# Patient Record
Sex: MALE | Race: WHITE | NOT HISPANIC OR LATINO | URBAN - METROPOLITAN AREA
[De-identification: names, ages, dates, MRNs, and addresses within clinical notes are randomized per-mention and may not be internally consistent; named-entity substitution may affect disease eponyms.]

---

## 2017-02-01 ENCOUNTER — OUTPATIENT (OUTPATIENT)
Dept: OUTPATIENT SERVICES | Facility: HOSPITAL | Age: 57
LOS: 1 days | End: 2017-02-01
Payer: MEDICAID

## 2017-03-16 DIAGNOSIS — R69 ILLNESS, UNSPECIFIED: ICD-10-CM

## 2018-03-01 PROCEDURE — G9001: CPT

## 2018-09-21 ENCOUNTER — APPOINTMENT (OUTPATIENT)
Dept: FAMILY MEDICINE | Facility: CLINIC | Age: 58
End: 2018-09-21
Payer: MEDICAID

## 2018-09-21 VITALS
RESPIRATION RATE: 16 BRPM | DIASTOLIC BLOOD PRESSURE: 70 MMHG | WEIGHT: 181 LBS | TEMPERATURE: 98.5 F | HEART RATE: 69 BPM | HEIGHT: 70 IN | BODY MASS INDEX: 25.91 KG/M2 | OXYGEN SATURATION: 97 % | SYSTOLIC BLOOD PRESSURE: 140 MMHG

## 2018-09-21 DIAGNOSIS — Z13.228 ENCOUNTER FOR SCREENING FOR OTHER SUSPECTED ENDOCRINE DISORDER: ICD-10-CM

## 2018-09-21 DIAGNOSIS — Z87.898 PERSONAL HISTORY OF OTHER SPECIFIED CONDITIONS: ICD-10-CM

## 2018-09-21 DIAGNOSIS — Z13.29 ENCOUNTER FOR SCREENING FOR OTHER SUSPECTED ENDOCRINE DISORDER: ICD-10-CM

## 2018-09-21 DIAGNOSIS — Z13.220 ENCOUNTER FOR SCREENING FOR LIPOID DISORDERS: ICD-10-CM

## 2018-09-21 DIAGNOSIS — Z86.79 PERSONAL HISTORY OF OTHER DISEASES OF THE CIRCULATORY SYSTEM: ICD-10-CM

## 2018-09-21 DIAGNOSIS — Z13.21 ENCOUNTER FOR SCREENING FOR NUTRITIONAL DISORDER: ICD-10-CM

## 2018-09-21 DIAGNOSIS — Z13.0 ENCOUNTER FOR SCREENING FOR OTHER SUSPECTED ENDOCRINE DISORDER: ICD-10-CM
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
## 2018-09-21 PROCEDURE — G0008: CPT

## 2018-09-21 PROCEDURE — 90686 IIV4 VACC NO PRSV 0.5 ML IM: CPT

## 2018-09-21 PROCEDURE — 99215 OFFICE O/P EST HI 40 MIN: CPT | Mod: 25

## 2018-09-21 NOTE — PHYSICAL EXAM
[No Acute Distress] : no acute distress [Well Developed] : well developed [No Lymphadenopathy] : no lymphadenopathy [Thyroid Normal, No Nodules] : the thyroid was normal and there were no nodules present [Clear to Auscultation] : lungs were clear to auscultation bilaterally [Regular Rhythm] : with a regular rhythm [No Carotid Bruits] : no carotid bruits [No Edema] : there was no peripheral edema [Soft] : abdomen soft [No HSM] : no HSM [Normal Anterior Cervical Nodes] : no anterior cervical lymphadenopathy [Normal Insight/Judgement] : insight and judgment were intact [de-identified] : 1/6 systolic ejection

## 2018-09-21 NOTE — HISTORY OF PRESENT ILLNESS
[de-identified] : Status post inferior wall MI with stenting in 2012. Patient was intolerant of at least 2 statins and he has not followed up with cardiology. His only medication is aspirin one a day. He works as a darlene. No cardiovascular symptoms.\par \par Left knee has locked in partial flexion twice in the past month when trying to arise from kneeling position extremely painful. No swelling.\par \par Complaining of decreased libido and inability to maintain erections. Saw another physician and was told his free testosterone was low. Patient would like testosterone replacement if indicated.\par \par History of hyperkalemia. Patient is on low potassium diet\par \par Elevated glucose in the past was treated with metformin and glipizide episodes of hypoglycemia causing to stop those medications\par \par Congenital heart murmur

## 2018-09-21 NOTE — ASSESSMENT
[FreeTextEntry1] : History of MI, myalgias on statin. Will give a trial of pravastatin 20 mg with coenzyme Q10. Zetia or Praulent if cant tolerate\par \par Low-free, testosterone with decreased libido. Will repeat labs in the a.m. testosterone supplementation is somewhat risky in face of coronary artery disease.\par \par Probable torn medial meniscus left knee. Will check MRI.\par \par Status post MI, along with congenital heart murmur, referral to cardiology made

## 2018-09-25 LAB
ALBUMIN SERPL ELPH-MCNC: 4.8 G/DL
ALP BLD-CCNC: 42 U/L
ALT SERPL-CCNC: 17 U/L
ANION GAP SERPL CALC-SCNC: 14 MMOL/L
AST SERPL-CCNC: 21 U/L
BASOPHILS # BLD AUTO: 0.01 K/UL
BASOPHILS NFR BLD AUTO: 0.2 %
BILIRUB SERPL-MCNC: 1.1 MG/DL
BUN SERPL-MCNC: 18 MG/DL
CALCIUM SERPL-MCNC: 10 MG/DL
CHLORIDE SERPL-SCNC: 102 MMOL/L
CHOLEST SERPL-MCNC: 166 MG/DL
CHOLEST/HDLC SERPL: 4 RATIO
CO2 SERPL-SCNC: 26 MMOL/L
CREAT SERPL-MCNC: 0.64 MG/DL
EOSINOPHIL # BLD AUTO: 0.07 K/UL
EOSINOPHIL NFR BLD AUTO: 1.4 %
GLUCOSE SERPL-MCNC: 110 MG/DL
HBA1C MFR BLD HPLC: 6.6 %
HCT VFR BLD CALC: 40.3 %
HDLC SERPL-MCNC: 42 MG/DL
HGB BLD-MCNC: 13.3 G/DL
IMM GRANULOCYTES NFR BLD AUTO: 0.2 %
LDLC SERPL CALC-MCNC: 110 MG/DL
LYMPHOCYTES # BLD AUTO: 1.09 K/UL
LYMPHOCYTES NFR BLD AUTO: 22.6 %
MAN DIFF?: NORMAL
MCHC RBC-ENTMCNC: 30.3 PG
MCHC RBC-ENTMCNC: 33 GM/DL
MCV RBC AUTO: 91.8 FL
MONOCYTES # BLD AUTO: 0.3 K/UL
MONOCYTES NFR BLD AUTO: 6.2 %
NEUTROPHILS # BLD AUTO: 3.35 K/UL
NEUTROPHILS NFR BLD AUTO: 69.4 %
PLATELET # BLD AUTO: 184 K/UL
POTASSIUM SERPL-SCNC: 4.9 MMOL/L
PROT SERPL-MCNC: 7.5 G/DL
RBC # BLD: 4.39 M/UL
RBC # FLD: 12.7 %
SODIUM SERPL-SCNC: 142 MMOL/L
TRIGL SERPL-MCNC: 69 MG/DL
WBC # FLD AUTO: 4.83 K/UL

## 2018-09-27 LAB
TESTOST BND SERPL-MCNC: 8.68 NG/DL
TESTOSTERONE BIOAVAILABLE: 72 NG/DL
TESTOSTERONE TOTAL S: 723 NG/DL

## 2018-10-01 ENCOUNTER — APPOINTMENT (OUTPATIENT)
Dept: MRI IMAGING | Facility: CLINIC | Age: 58
End: 2018-10-01

## 2018-10-01 PROBLEM — S83.209A ACUTE TORN MENISCUS: Status: ACTIVE | Noted: 2018-09-21

## 2018-10-01 LAB
TESTOST BND SERPL-MCNC: 8.4 PG/ML
TESTOST SERPL-MCNC: 867.4 NG/DL

## 2018-10-04 ENCOUNTER — APPOINTMENT (OUTPATIENT)
Dept: CARDIOLOGY | Facility: CLINIC | Age: 58
End: 2018-10-04

## 2018-10-04 DIAGNOSIS — S83.209A UNSPECIFIED TEAR OF UNSPECIFIED MENISCUS, CURRENT INJURY, UNSPECIFIED KNEE, INITIAL ENCOUNTER: ICD-10-CM

## 2018-10-29 ENCOUNTER — APPOINTMENT (OUTPATIENT)
Dept: CARDIOLOGY | Facility: CLINIC | Age: 58
End: 2018-10-29

## 2018-10-29 ENCOUNTER — APPOINTMENT (OUTPATIENT)
Dept: FAMILY MEDICINE | Facility: CLINIC | Age: 58
End: 2018-10-29

## 2018-11-12 ENCOUNTER — APPOINTMENT (OUTPATIENT)
Dept: FAMILY MEDICINE | Facility: CLINIC | Age: 58
End: 2018-11-12

## 2019-01-03 LAB — HEMOCCULT STL QL IA: NEGATIVE

## 2020-02-13 ENCOUNTER — TRANSCRIPTION ENCOUNTER (OUTPATIENT)
Age: 60
End: 2020-02-13

## 2020-02-24 ENCOUNTER — APPOINTMENT (OUTPATIENT)
Dept: FAMILY MEDICINE | Facility: CLINIC | Age: 60
End: 2020-02-24
Payer: MEDICAID

## 2020-02-24 VITALS
TEMPERATURE: 98.2 F | WEIGHT: 180.25 LBS | SYSTOLIC BLOOD PRESSURE: 124 MMHG | HEART RATE: 67 BPM | HEIGHT: 69 IN | OXYGEN SATURATION: 98 % | BODY MASS INDEX: 26.7 KG/M2 | DIASTOLIC BLOOD PRESSURE: 86 MMHG

## 2020-02-24 DIAGNOSIS — R05 COUGH: ICD-10-CM

## 2020-02-24 PROCEDURE — 99214 OFFICE O/P EST MOD 30 MIN: CPT

## 2020-02-26 NOTE — PHYSICAL EXAM
[Normal Oropharynx] : the oropharynx was normal [No Lymphadenopathy] : no lymphadenopathy [Supple] : supple [Normal] : normal rate, regular rhythm, normal S1 and S2 and no murmur heard

## 2020-02-26 NOTE — HISTORY OF PRESENT ILLNESS
[FreeTextEntry8] : Pt c/o residual persistent cough after flu illness 3 weeks ago. Other symptoms of myalgias and fever had resolved. Pt saw urgent care 2 weeks ago and was rx tessalon perles and medrol dany. Albuterol inhaler helped symptoms. Denies fever, chills. \par Pt also has hx of CAD s/p 3 stents, has not seen cardiologist recently. Pt due to f/u with cardio. Denies CP, palpitations.

## 2020-02-26 NOTE — ASSESSMENT
[FreeTextEntry1] : persistent cough, likely postviral cough syndrome - cont albuterol prn. if no improvement obtain CXR.\par CAD- refer to cardiology for followup\par referral to GI/colorectal for screening colonoscopy\par advised pt to make appt w/ PCP for CPE

## 2020-03-24 ENCOUNTER — APPOINTMENT (OUTPATIENT)
Dept: UROLOGY | Facility: CLINIC | Age: 60
End: 2020-03-24

## 2020-03-24 ENCOUNTER — APPOINTMENT (OUTPATIENT)
Dept: UROLOGY | Facility: CLINIC | Age: 60
End: 2020-03-24
Payer: MEDICAID

## 2020-03-24 VITALS
DIASTOLIC BLOOD PRESSURE: 70 MMHG | HEART RATE: 70 BPM | SYSTOLIC BLOOD PRESSURE: 134 MMHG | BODY MASS INDEX: 26.66 KG/M2 | OXYGEN SATURATION: 98 % | WEIGHT: 180 LBS | TEMPERATURE: 98 F | HEIGHT: 69 IN

## 2020-03-24 DIAGNOSIS — Z80.42 FAMILY HISTORY OF MALIGNANT NEOPLASM OF PROSTATE: ICD-10-CM

## 2020-03-24 DIAGNOSIS — Z63.5 DISRUPTION OF FAMILY BY SEPARATION AND DIVORCE: ICD-10-CM

## 2020-03-24 DIAGNOSIS — N50.82 SCROTAL PAIN: ICD-10-CM

## 2020-03-24 DIAGNOSIS — Z86.39 PERSONAL HISTORY OF OTHER ENDOCRINE, NUTRITIONAL AND METABOLIC DISEASE: ICD-10-CM

## 2020-03-24 DIAGNOSIS — Z82.49 FAMILY HISTORY OF ISCHEMIC HEART DISEASE AND OTHER DISEASES OF THE CIRCULATORY SYSTEM: ICD-10-CM

## 2020-03-24 PROCEDURE — 99204 OFFICE O/P NEW MOD 45 MIN: CPT

## 2020-03-24 SDOH — SOCIAL STABILITY - SOCIAL INSECURITY: DISRUPTION OF FAMILY BY SEPARATION AND DIVORCE: Z63.5

## 2020-03-24 NOTE — ASSESSMENT
[FreeTextEntry1] : 60 yo M with LEFT scrotal pain and ED.\par For scrotal pain, suspect epididymitis and seems mild. Recommend NSAIDs and scrotal US.\par \par For ED, we discussed that ED etiology can be psychogenic, arterial, venous leak, neurologic or a combination. Penile Ultrasound can be performed to evaluate cardiovascular causes. We discussed treatment options include oral PDE5 inhibitors, intraurethral alprostadil, intracavernosal injections, or penile implant placement surgery. patient was instructed to take 60 mg of sildenafil 1-2 hours prior to desired time of sexual activity. Pt was instructed that medication is most effective on an empty stomach without alcohol intake, and that medication requires sexual stimulation to work. The adverse effects, including low blood pressure, vision changes, headache, GI upset. Pt will seek emergent medical attention if he develops chest pain or an erection lasting more than 4 hours. Pt will discontinue sildenafil if he is placed on nitroglycerin for cardiac disease.

## 2020-03-24 NOTE — REVIEW OF SYSTEMS
[Palpitations] : palpitations [Poor quality erections] : Poor quality erections [Joint Pain] : joint pain [Negative] : Heme/Lymph

## 2020-03-24 NOTE — PHYSICAL EXAM
[General Appearance - Well Developed] : well developed [General Appearance - Well Nourished] : well nourished [Normal Appearance] : normal appearance [Well Groomed] : well groomed [General Appearance - In No Acute Distress] : no acute distress [Edema] : no peripheral edema [Respiration, Rhythm And Depth] : normal respiratory rhythm and effort [Exaggerated Use Of Accessory Muscles For Inspiration] : no accessory muscle use [Abdomen Soft] : soft [Abdomen Tenderness] : non-tender [Costovertebral Angle Tenderness] : no ~M costovertebral angle tenderness [Urethral Meatus] : meatus normal [Urinary Bladder Findings] : the bladder was normal on palpation [Scrotum] : the scrotum was normal [Testes Mass (___cm)] : there were no testicular masses [Prostate Tenderness] : the prostate was not tender [No Prostate Nodules] : no prostate nodules [Prostate Size ___ gm] : prostate size [unfilled] gm [FreeTextEntry1] : mild tenderness to palpation of LEFT epididymis [Normal Station and Gait] : the gait and station were normal for the patient's age [] : no rash [No Focal Deficits] : no focal deficits [Oriented To Time, Place, And Person] : oriented to person, place, and time [Affect] : the affect was normal [Mood] : the mood was normal [Not Anxious] : not anxious [No Palpable Adenopathy] : no palpable adenopathy

## 2020-03-24 NOTE — HISTORY OF PRESENT ILLNESS
[FreeTextEntry1] : 59 year old man seen 03/24/2020 with complaint of LEFT scrotal pain. This began 3 months ago. It comes and goes. It is mild in severity. Touching testicles makes the symptoms worse, nothing makes sx better. \par It is associated with new anejaculation. He also reports ED, with some tumescence, but no rigidity. He tried sildenafil years with success. \par No hematuria, no dysuria, no frequency, no urgency, no hesitancy, no straining. No incontinence. \par No fevers, no chills, no nausea, no vomiting, no flank pain. \par \par No family history contributory to ED or scrotal pain.

## 2020-04-09 RX ORDER — TADALAFIL 20 MG/1
20 TABLET ORAL
Qty: 6 | Refills: 5 | Status: ACTIVE | COMMUNITY
Start: 2018-09-27 | End: 1900-01-01

## 2020-05-01 ENCOUNTER — NON-APPOINTMENT (OUTPATIENT)
Age: 60
End: 2020-05-01

## 2020-05-01 ENCOUNTER — APPOINTMENT (OUTPATIENT)
Dept: FAMILY MEDICINE | Facility: CLINIC | Age: 60
End: 2020-05-01
Payer: MEDICAID

## 2020-05-01 VITALS
OXYGEN SATURATION: 96 % | DIASTOLIC BLOOD PRESSURE: 80 MMHG | HEART RATE: 62 BPM | WEIGHT: 172 LBS | BODY MASS INDEX: 24.62 KG/M2 | SYSTOLIC BLOOD PRESSURE: 146 MMHG | HEIGHT: 70 IN

## 2020-05-01 VITALS — DIASTOLIC BLOOD PRESSURE: 78 MMHG | SYSTOLIC BLOOD PRESSURE: 130 MMHG

## 2020-05-01 DIAGNOSIS — R25.8 OTHER ABNORMAL INVOLUNTARY MOVEMENTS: ICD-10-CM

## 2020-05-01 DIAGNOSIS — R26.81 UNSTEADINESS ON FEET: ICD-10-CM

## 2020-05-01 DIAGNOSIS — H93.A1 PULSATILE TINNITUS, RIGHT EAR: ICD-10-CM

## 2020-05-01 DIAGNOSIS — H53.9 UNSPECIFIED VISUAL DISTURBANCE: ICD-10-CM

## 2020-05-01 DIAGNOSIS — R00.2 PALPITATIONS: ICD-10-CM

## 2020-05-01 PROCEDURE — 99214 OFFICE O/P EST MOD 30 MIN: CPT | Mod: 25

## 2020-05-01 PROCEDURE — 93000 ELECTROCARDIOGRAM COMPLETE: CPT

## 2020-05-01 RX ORDER — BENZONATATE 100 MG/1
100 CAPSULE ORAL
Qty: 21 | Refills: 0 | Status: COMPLETED | COMMUNITY
Start: 2020-02-13

## 2020-05-01 RX ORDER — ALBUTEROL SULFATE 90 UG/1
108 (90 BASE) INHALANT RESPIRATORY (INHALATION)
Qty: 7 | Refills: 0 | Status: COMPLETED | COMMUNITY
Start: 2020-02-13

## 2020-05-01 RX ORDER — METHYLPREDNISOLONE 4 MG/1
4 TABLET ORAL
Qty: 21 | Refills: 0 | Status: COMPLETED | COMMUNITY
Start: 2020-02-13

## 2020-05-04 ENCOUNTER — OUTPATIENT (OUTPATIENT)
Dept: OUTPATIENT SERVICES | Facility: HOSPITAL | Age: 60
LOS: 1 days | End: 2020-05-04
Payer: MEDICAID

## 2020-05-04 ENCOUNTER — APPOINTMENT (OUTPATIENT)
Dept: CT IMAGING | Facility: CLINIC | Age: 60
End: 2020-05-04
Payer: MEDICAID

## 2020-05-04 DIAGNOSIS — H93.A1 PULSATILE TINNITUS, RIGHT EAR: ICD-10-CM

## 2020-05-04 DIAGNOSIS — Z00.8 ENCOUNTER FOR OTHER GENERAL EXAMINATION: ICD-10-CM

## 2020-05-04 LAB
ALBUMIN SERPL ELPH-MCNC: 5 G/DL
ALP BLD-CCNC: 60 U/L
ALT SERPL-CCNC: 45 U/L
ANION GAP SERPL CALC-SCNC: 15 MMOL/L
AST SERPL-CCNC: 23 U/L
BASOPHILS # BLD AUTO: 0.03 K/UL
BASOPHILS NFR BLD AUTO: 0.5 %
BILIRUB SERPL-MCNC: 1 MG/DL
BUN SERPL-MCNC: 12 MG/DL
CALCIUM SERPL-MCNC: 10.1 MG/DL
CHLORIDE SERPL-SCNC: 99 MMOL/L
CHOLEST SERPL-MCNC: 134 MG/DL
CHOLEST/HDLC SERPL: 3.8 RATIO
CO2 SERPL-SCNC: 27 MMOL/L
CREAT SERPL-MCNC: 0.8 MG/DL
EOSINOPHIL # BLD AUTO: 0.04 K/UL
EOSINOPHIL NFR BLD AUTO: 0.7 %
ESTIMATED AVERAGE GLUCOSE: 143 MG/DL
FSH SERPL-MCNC: 4 IU/L
GLUCOSE SERPL-MCNC: 127 MG/DL
HBA1C MFR BLD HPLC: 6.6 %
HCT VFR BLD CALC: 41.6 %
HDLC SERPL-MCNC: 35 MG/DL
HGB BLD-MCNC: 13.7 G/DL
IMM GRANULOCYTES NFR BLD AUTO: 0.2 %
LDLC SERPL CALC-MCNC: 82 MG/DL
LH SERPL-ACNC: 5.5 IU/L
LYMPHOCYTES # BLD AUTO: 1.1 K/UL
LYMPHOCYTES NFR BLD AUTO: 18 %
MAN DIFF?: NORMAL
MCHC RBC-ENTMCNC: 31.2 PG
MCHC RBC-ENTMCNC: 32.9 GM/DL
MCV RBC AUTO: 94.8 FL
MONOCYTES # BLD AUTO: 0.37 K/UL
MONOCYTES NFR BLD AUTO: 6.1 %
NEUTROPHILS # BLD AUTO: 4.56 K/UL
NEUTROPHILS NFR BLD AUTO: 74.5 %
PLATELET # BLD AUTO: 210 K/UL
POTASSIUM SERPL-SCNC: 5.3 MMOL/L
PROLACTIN SERPL-MCNC: 8.7 NG/ML
PROT SERPL-MCNC: 7.2 G/DL
PSA SERPL-MCNC: 0.78 NG/ML
RBC # BLD: 4.39 M/UL
RBC # FLD: 11.9 %
SODIUM SERPL-SCNC: 141 MMOL/L
T4 FREE SERPL-MCNC: 1.1 NG/DL
TESTOST SERPL-MCNC: 925 NG/DL
TRIGL SERPL-MCNC: 84 MG/DL
TSH SERPL-ACNC: 1.38 UIU/ML
WBC # FLD AUTO: 6.11 K/UL

## 2020-05-04 PROCEDURE — 70496 CT ANGIOGRAPHY HEAD: CPT

## 2020-05-04 PROCEDURE — 70496 CT ANGIOGRAPHY HEAD: CPT | Mod: 26

## 2020-05-04 NOTE — HISTORY OF PRESENT ILLNESS
[FreeTextEntry8] : Pt c/o intermittent feeling of feeling dizzy and falling over x 4 days. Pt has been falling towards the left. Pt reports inc vision blurriness, palpitations, nausea. Pt having chills. Pt reports being able to hear "beating of his heart" from his R ear. Pt has family hx of pituitary tumors. Denies fever, cough, anosmia, sinus pain.\par \par Pt has DM, fasting 140-145, nonfasting 200+. Pt has been noncompliant w/ following up with PCP.\par pt has hx of CAD w/ stent, has not seen cardio in 3-4 years. Denies CP, dyspnea, n/v.\par Pt reports he has hx of low testosterone in the past, would like to recheck his levels. He has been experiencing ED.

## 2020-05-04 NOTE — REVIEW OF SYSTEMS
[Fatigue] : fatigue [Palpitations] : palpitations [Dizziness] : dizziness [Unsteady Walk] : ataxia [Negative] : Psychiatric [FreeTextEntry4] : see hpi

## 2020-05-04 NOTE — ASSESSMENT
[FreeTextEntry1] : Dizziness, vision changes, unsteady gait, pulsatile tinnitus of R ear - check CTA brain\par hx of MI, CAD, DM - check labs, needs f/u with cardio\par ED - hx of low testosterone check labs

## 2020-05-05 ENCOUNTER — APPOINTMENT (OUTPATIENT)
Dept: UROLOGY | Facility: CLINIC | Age: 60
End: 2020-05-05

## 2020-05-06 ENCOUNTER — APPOINTMENT (OUTPATIENT)
Dept: UROLOGY | Facility: CLINIC | Age: 60
End: 2020-05-06
Payer: MEDICAID

## 2020-05-06 ENCOUNTER — APPOINTMENT (OUTPATIENT)
Dept: UROLOGY | Facility: CLINIC | Age: 60
End: 2020-05-06

## 2020-05-06 DIAGNOSIS — I86.1 SCROTAL VARICES: ICD-10-CM

## 2020-05-06 PROCEDURE — 99214 OFFICE O/P EST MOD 30 MIN: CPT | Mod: 95

## 2020-05-06 NOTE — ASSESSMENT
[FreeTextEntry1] : 1) Left scrotal pain - most likely LT varicocele because of Hx \par Wear athletic supporter on top of briefs, lie flat when possible, apply cold prn & continue advil prn.\par \par 2) ED - arterial insuff. > S/P MI & DM II\par Pt going away locally with girlfriend in 4 days & is requesting alprostadil injection. He said he is diabetic & familiar with injection administration & he has watched youTube videos on penile injection. Guidelines & sterile injection technique were explained. He was advised to go to ER if erection lasts longer than 3-4 hours.\par He will start with 10 mcg/cc kit & inject 0.5 cc (5 mcg) IC & call with result as to adequacy of the dose.\par \par 3) Elevated testosterone : 5/1/20  total T 925 ([id number]); LH, FSH & prolactin all nl > PCP working up adrenals\par

## 2020-05-06 NOTE — HISTORY OF PRESENT ILLNESS
[Home] : at home, [unfilled] , at the time of the visit. [Other Location: e.g. Home (Enter Location, City,State)___] : at [unfilled] [Patient] : the patient [Self] : self [FreeTextEntry1] : The patient-doctor relationship has been established in a face to face fashion via real time video/audio HIPAA compliant communication using telemedicine software.  The patient's identity has been confirmed.  The patient was previously emailed a copy of the telemedicine consent.  They have had a chance to review and has now given verbal consent and has requested care to be assessed and treated through telemedicine and understands there maybe limitations in this process and they may need further follow up care in the office and or hospital settings.   \par  \par Verbal consent given on 05/06/2020 and 9:50 AM by Mr. SEBASTIAN HAYDEN, ____ (Relationship to the patient). \par \par 2 attempts at connecting via telemedicine hermelinda failed so visit was conducted by phone.\par \par The 1st time he had the left testicular pain was 4 months ago after intercourse. Since then, it has been intermittent & only with touch or movement. Dr. Gallo advised ibuprofen & this was beneficial. It hasn't been bothersome.\par \par He c/o ED not being able to get an erection since his MI 8 yrs ago. Viagra was successful intermittently initially but not now. He just started a new relationship & is going away locally with her in 4 days. He is very anxious & nervous that pills will not be sufficient. He is requesting injection therapy which his cousin uses very successfully. \par He mentioned to his PCP that his libido has markedly declined, he is very marrero, & very emotional. His PCP got a total T & it was 925 ([id number]). He denied exogenous testosterone administration.\par He denies all other urological & constitutional Sx's.\par \par His PMH, PSH, medication Hx & allergy Hx are unchanged.\par  \par

## 2020-05-08 DIAGNOSIS — Z20.828 CONTACT WITH AND (SUSPECTED) EXPOSURE TO OTHER VIRAL COMMUNICABLE DISEASES: ICD-10-CM

## 2020-05-11 LAB
ESTRADIOL SERPL-MCNC: 24 PG/ML
SARS-COV-2 IGG SERPL IA-ACNC: <0.1 INDEX
SARS-COV-2 IGG SERPL QL IA: NEGATIVE

## 2020-05-12 LAB
ESTROGEN SERPL-MCNC: 147 PG/ML
TESTOST BND SERPL-MCNC: 9.1 PG/ML
TESTOST SERPL-MCNC: 851.4 NG/DL

## 2020-05-12 RX ORDER — ALPROSTADIL 10 UG/ML
10 INJECTION, POWDER, LYOPHILIZED, FOR SOLUTION INTRACAVERNOUS
Qty: 1 | Refills: 1 | Status: DISCONTINUED | COMMUNITY
Start: 2020-05-06 | End: 2020-05-12

## 2020-05-19 ENCOUNTER — RESULT REVIEW (OUTPATIENT)
Age: 60
End: 2020-05-19

## 2020-05-19 ENCOUNTER — OUTPATIENT (OUTPATIENT)
Dept: OUTPATIENT SERVICES | Facility: HOSPITAL | Age: 60
LOS: 1 days | End: 2020-05-19
Payer: MEDICAID

## 2020-05-19 ENCOUNTER — APPOINTMENT (OUTPATIENT)
Dept: CT IMAGING | Facility: CLINIC | Age: 60
End: 2020-05-19
Payer: MEDICAID

## 2020-05-19 DIAGNOSIS — Z00.8 ENCOUNTER FOR OTHER GENERAL EXAMINATION: ICD-10-CM

## 2020-05-19 PROCEDURE — 74150 CT ABDOMEN W/O CONTRAST: CPT

## 2020-05-19 PROCEDURE — 74150 CT ABDOMEN W/O CONTRAST: CPT | Mod: 26

## 2020-06-05 ENCOUNTER — TRANSCRIPTION ENCOUNTER (OUTPATIENT)
Age: 60
End: 2020-06-05

## 2020-06-22 ENCOUNTER — APPOINTMENT (OUTPATIENT)
Dept: ENDOCRINOLOGY | Facility: CLINIC | Age: 60
End: 2020-06-22
Payer: MEDICAID

## 2020-06-22 ENCOUNTER — LABORATORY RESULT (OUTPATIENT)
Age: 60
End: 2020-06-22

## 2020-06-22 ENCOUNTER — APPOINTMENT (OUTPATIENT)
Dept: CARDIOLOGY | Facility: CLINIC | Age: 60
End: 2020-06-22
Payer: MEDICAID

## 2020-06-22 VITALS
HEART RATE: 58 BPM | WEIGHT: 168 LBS | HEIGHT: 69.5 IN | SYSTOLIC BLOOD PRESSURE: 120 MMHG | OXYGEN SATURATION: 99 % | DIASTOLIC BLOOD PRESSURE: 80 MMHG | BODY MASS INDEX: 24.32 KG/M2

## 2020-06-22 VITALS
DIASTOLIC BLOOD PRESSURE: 80 MMHG | HEIGHT: 70 IN | HEART RATE: 63 BPM | SYSTOLIC BLOOD PRESSURE: 130 MMHG | BODY MASS INDEX: 24.05 KG/M2 | WEIGHT: 168 LBS

## 2020-06-22 DIAGNOSIS — R23.2 FLUSHING: ICD-10-CM

## 2020-06-22 PROCEDURE — 99205 OFFICE O/P NEW HI 60 MIN: CPT

## 2020-06-22 PROCEDURE — 99204 OFFICE O/P NEW MOD 45 MIN: CPT

## 2020-06-22 RX ORDER — PRAVASTATIN SODIUM 20 MG/1
20 TABLET ORAL
Qty: 30 | Refills: 3 | Status: DISCONTINUED | COMMUNITY
Start: 2018-09-21 | End: 2020-06-22

## 2020-06-22 RX ORDER — ALPROSTADIL 20 UG/ML
20 INJECTION, POWDER, LYOPHILIZED, FOR SOLUTION INTRACAVERNOUS
Qty: 6 | Refills: 0 | Status: DISCONTINUED | COMMUNITY
Start: 2020-05-11 | End: 2020-06-22

## 2020-06-22 NOTE — HISTORY OF PRESENT ILLNESS
[FreeTextEntry1] : Mr. HAYDEN is a 60 year old male,  presents today for a consultation in regards type 2 diabetes and some vague symptoms.  \par Per patient , has been diabetic since 30 years, has controlled with diet and exercise, but says since last few months his sugars have Been high.\par Family history includes father having diabetes\par \par Currently taking metformin 500 mg , 2 pills in morning and 1 pill at night.  Started few months ago\par A1C 6.6% \par \par Home Glucose Monitoring\par Frequency: 1 to 2 x a day\par BG Documentation:  per patient recall.\par BG Readings -   Fasting 150s to 180 ,before starting metformin , now around 130s to 150s\par                              \par                               HS       160s to 180s\par No lows, says his diet is good, dopes not  eat junk foods.\par \par Diabetic History\par ER Visits:  no\par Hospitalizations: no\par Diet Therapy: says his diet is healthy\par Exercise:  physically active, works with \par Last eye exam: none.recommended to get eye exam done.\par \par He says since last few months he started having some vague symptoms, started complaining of weight loss, about 15 pounds, flushing , shaky, marrero feeling, no energy, no sex drive, ED, sleep not good.\par His PCP checked him for some hormones, testosterone was normal, estrogen mildly high, prolactin, normal, thyroid functions normal.\par He denies taking any supplements.\par No nipple discharge, no tenderness, no dizziness, no LOC.\par He had CT abdomen in 5/2020, showed normal adrenals, also CTA of head did not show any pituitary abnormality.

## 2020-06-22 NOTE — HISTORY OF PRESENT ILLNESS
[FreeTextEntry1] : Pt is a 61 y/o M who is referred here today by their PCP for evaluation.  Pt has PMH CAD s/p MI 12/2012 L shoulder/arm pain s/p 3 stents [facility], he states that his heart muscle was "weakened" but then recovered, DM.  Since then he has changed habits but he has not followed with cardiology in years.  He stopped his statin and ASA.  He currently feels well and has no cardiac complaints.  He denies CP, SOB, diaphoresis, palpitations, dizziness, syncope, LE edema, PND.   \par pravastatin - leg cramps/muscle aches\par \par PMH: CAD s/p MI/PCI, DM, HLD\par Smoking status: never\par no ETOH\par no drug use\par Current exercise: employed as a , walks\par Daily water intake: "a lot"\par Daily caffeine intake: not consistent\par OTC medications: none\par Family hx: father MI 57, mother MI/PPM 60's, sister lupus\par Previous hospitalizations: MI 2012, hernia repair as a child\par

## 2020-06-22 NOTE — PHYSICAL EXAM
[Alert] : alert [Well Nourished] : well nourished [Normal Sclera/Conjunctiva] : normal sclera/conjunctiva [No Proptosis] : no proptosis [No Lid Lag] : no lid lag [No Neck Mass] : no neck mass was observed [No Respiratory Distress] : no respiratory distress [Thyroid Not Enlarged] : the thyroid was not enlarged [Clear to Auscultation] : lungs were clear to auscultation bilaterally [No Accessory Muscle Use] : no accessory muscle use [Regular Rhythm] : with a regular rhythm [Normal Rate] : heart rate was normal [Normal S1, S2] : normal S1 and S2 [Normal Bowel Sounds] : normal bowel sounds [Not Tender] : non-tender [Not Distended] : not distended [Normal Gait] : normal gait [Soft] : abdomen soft [Normal Supraclavicular Nodes] : no supraclavicular lymphadenopathy [No Stigmata of Cushings Syndrome] : no stigmata of Cushings Syndrome [No Clubbing, Cyanosis] : no clubbing  or cyanosis of the fingernails [No Skin Lesions] : no skin lesions [No Involuntary Movements] : no involuntary movements were seen [No Motor Deficits] : the motor exam was normal [Oriented x3] : oriented to person, place, and time [No Tremors] : no tremors [Normal Insight/Judgement] : insight and judgment were intact [Normal Affect] : the affect was normal [Normal Mood] : the mood was normal [No Murmurs] : no murmurs [No Rubs] : no pericardial rub [Carotids Normal] : carotid pulses were normal with no bruits [No Edema] : no peripheral edema [Right Foot Was Examined] : right foot ~C was examined [Left Foot Was Examined] : left foot ~C was examined [Acanthosis Nigricans] : no acanthosis nigricans [Delayed in the Right Toes] : normal in the toes [Full ROM] : with limited range of motion [Swelling] : not swollen [Tenderness] : not tender [Erythema] : not erythematous [Delayed in the Left Toes] : normal in the toes [Vibration Dec.] : normal vibratory sensation at the level of the toes [Diminished Throughout Both Feet] : normal tactile sensation with monofilament testing throughout both feet [Position Sense Dec.] : normal position sense at the level of the toes

## 2020-06-22 NOTE — ASSESSMENT
[FreeTextEntry1] : Mr. HAYDEN is a 60 year old male,  presents today for a consultation in regards type 2 diabetes and some vague symptoms.  \par He recently started metformin , a1c was 6.6%.\par \par Medication changes: none this visit.\par Will first repeat a1c.\par \par  Eyes:no active issues, not up to date for exams. recommended to get eye exam done.\par Feet: no active issues, no  neuropathy. up to date for podiatric care. Diabetic foot care reviewed.\par  Lipids: not on statin/ anti-lipid treatment. Last LDL: 82, stopped statin many years ago due to side effects, will repeat fasting lipids.\par  Renal/ B/P : B/P wnl ,  not on  ACE/ ARB.  will check for proteinuria.  no known nephropathy. \par Weight:  .  Balanced diet and exercise reviewed.\par Complains of flushing, mood issues, Erectile dysfunction , decreased sexual performance, urology has seen him, his testosterone levels were mildly high, repeat were normal.Has had mildly elevated estradiol levels, will repeat.\par Will check 24 hr urine cortisol, 24 hr urine metanephrines, serum estradiol, DHEAS.\par \par  EDUCATION: ADA diet (30-50 gm carbohydrates with each meal, 15 grams with snacks. Balance with lean proteins and low fat diet.) Exercise daily per ability, work up to 30 minutes a day. Medication, risks and benefits reviewed. Glucose testing and insulin administration for glycemic management.\par  Influenza vaccination \par \par \par RTC in 3 months or sooner if needed.\par

## 2020-06-22 NOTE — DISCUSSION/SUMMARY
[FreeTextEntry1] : Pt is a 61 y/o M who is referred here today by their PCP for evaluation.  Pt has PMH CAD s/p MI 12/2012 L shoulder/arm pain s/p 3 stents [facility], he states that his heart muscle was "weakened" but then recovered, DM.\par Will check transthoracic echocardiogram to evaluate left ventricular function and assess for any structural abnormalities\par Will check nuclear stress test to eval for ischemia\par I have advised that he restart ASA and not stop it\par Will try lipitor 10mg\par BP well controlled - can consider ACEi/BB in the future (pt wishes to take as few medications as possible)\par Pt will return in 6 mnths or sooner as needed but I encouraged communication via phone or portal if necessary.\par The described plan was discussed with the pt.  All questions and concerns were addressed to the best of my knowledge.

## 2020-06-29 LAB
AFP-TM SERPL-MCNC: <1.8 NG/ML
CHOLEST SERPL-MCNC: 150 MG/DL
CHOLEST/HDLC SERPL: 3.6 RATIO
CREAT SPEC-SCNC: 113 MG/DL
HCG SERPL-MCNC: <1 MIU/ML
HDLC SERPL-MCNC: 41 MG/DL
IGF-1 INTERP: NORMAL
IGF-I BLD-MCNC: 198 NG/ML
LDLC SERPL CALC-MCNC: 85 MG/DL
MICROALBUMIN 24H UR DL<=1MG/L-MCNC: 1.3 MG/DL
MICROALBUMIN/CREAT 24H UR-RTO: 11 MG/G
TESTOST BND SERPL-MCNC: 7.9 PG/ML
TESTOST SERPL-MCNC: 768.3 NG/DL
TRIGL SERPL-MCNC: 117 MG/DL

## 2020-07-01 LAB — DHEA-SULFATE, SERUM: 52 UG/DL

## 2020-07-05 ENCOUNTER — NON-APPOINTMENT (OUTPATIENT)
Age: 60
End: 2020-07-05

## 2020-07-06 LAB — ESTROGEN SERPL-MCNC: 146 PG/ML

## 2020-07-14 ENCOUNTER — OUTPATIENT (OUTPATIENT)
Dept: OUTPATIENT SERVICES | Facility: HOSPITAL | Age: 60
LOS: 1 days | End: 2020-07-14
Payer: MEDICAID

## 2020-07-14 ENCOUNTER — APPOINTMENT (OUTPATIENT)
Dept: ULTRASOUND IMAGING | Facility: CLINIC | Age: 60
End: 2020-07-14
Payer: MEDICAID

## 2020-07-14 DIAGNOSIS — Z00.8 ENCOUNTER FOR OTHER GENERAL EXAMINATION: ICD-10-CM

## 2020-07-14 PROCEDURE — 93975 VASCULAR STUDY: CPT | Mod: 26

## 2020-07-14 PROCEDURE — 93975 VASCULAR STUDY: CPT

## 2020-08-05 ENCOUNTER — APPOINTMENT (OUTPATIENT)
Dept: CARDIOLOGY | Facility: CLINIC | Age: 60
End: 2020-08-05

## 2020-08-07 ENCOUNTER — APPOINTMENT (OUTPATIENT)
Dept: CARDIOLOGY | Facility: CLINIC | Age: 60
End: 2020-08-07

## 2020-08-10 ENCOUNTER — APPOINTMENT (OUTPATIENT)
Dept: UROLOGY | Facility: CLINIC | Age: 60
End: 2020-08-10

## 2020-08-24 ENCOUNTER — NON-APPOINTMENT (OUTPATIENT)
Age: 60
End: 2020-08-24

## 2020-08-24 LAB
CORTIS 24H UR-MCNC: 24 H
CORTIS 24H UR-MRATE: 39 MCG/24 H
SPECIMEN VOL 24H UR: 1700 ML

## 2020-08-26 LAB
DOPAMINE UR-MCNC: 312 UG/L
DOPAMINE, UR, 24HR: 374 UG/24 HR
EPINEPH UR-MCNC: 3 UG/L
EPINEPHRINE, U, 24HR: 4 UG/24 HR
NOREPINEPH UR-MCNC: 31 UG/L
NOREPINEPHRINE,U,24H: 37 UG/24 HR

## 2020-09-28 ENCOUNTER — RX RENEWAL (OUTPATIENT)
Age: 60
End: 2020-09-28

## 2020-10-15 ENCOUNTER — APPOINTMENT (OUTPATIENT)
Dept: ENDOCRINOLOGY | Facility: CLINIC | Age: 60
End: 2020-10-15

## 2020-10-26 ENCOUNTER — APPOINTMENT (OUTPATIENT)
Dept: ENDOCRINOLOGY | Facility: CLINIC | Age: 60
End: 2020-10-26

## 2020-12-21 ENCOUNTER — APPOINTMENT (OUTPATIENT)
Dept: CARDIOLOGY | Facility: CLINIC | Age: 60
End: 2020-12-21
Payer: MEDICAID

## 2020-12-21 ENCOUNTER — NON-APPOINTMENT (OUTPATIENT)
Age: 60
End: 2020-12-21

## 2020-12-21 VITALS
BODY MASS INDEX: 24.88 KG/M2 | DIASTOLIC BLOOD PRESSURE: 85 MMHG | HEIGHT: 69 IN | HEART RATE: 63 BPM | SYSTOLIC BLOOD PRESSURE: 148 MMHG | WEIGHT: 168 LBS | OXYGEN SATURATION: 98 %

## 2020-12-21 PROCEDURE — 99214 OFFICE O/P EST MOD 30 MIN: CPT | Mod: 25

## 2020-12-21 PROCEDURE — 93000 ELECTROCARDIOGRAM COMPLETE: CPT

## 2020-12-21 PROCEDURE — 99072 ADDL SUPL MATRL&STAF TM PHE: CPT

## 2020-12-21 NOTE — HISTORY OF PRESENT ILLNESS
[FreeTextEntry1] : Pt is a 59 y/o M who presents today for f/u.  Pt has PMH CAD s/p MI 12/2012 L shoulder/arm pain s/p 3 stents [facility], he states that his heart muscle was "weakened" but then recovered, DM.  He has restarted his ASA and statin.  He currently feels well and has no cardiac complaints.  He denies CP, SOB, diaphoresis, palpitations, dizziness, syncope, LE edema, PND.   He did not have his cardiac testing done\par pravastatin - leg cramps/muscle aches\par \par PMH: CAD s/p MI/PCI, DM, HLD\par Smoking status: never\par no ETOH\par no drug use\par Current exercise: employed as a , walks\par Daily water intake: "a lot"\par Daily caffeine intake: not consistent\par OTC medications: none\par Family hx: father MI 57, mother MI/PPM 60's, sister lupus\par Previous hospitalizations: MI 2012, hernia repair as a child\par

## 2020-12-21 NOTE — DISCUSSION/SUMMARY
[FreeTextEntry1] : Pt is a 61 y/o M PMH CAD s/p MI 12/2012 L shoulder/arm pain s/p 3 stents [facility], he states that his heart muscle was "weakened" but then recovered, DM.\par Will check transthoracic echocardiogram to evaluate left ventricular function and assess for any structural abnormalities\par Will check nuclear stress test to eval for ischemia\par c/w ASA\par c/w lipitor 10mg - tolerating this dose as he has had SE in the past - check labs\par BP well controlled - can consider ACEi/BB in the future (pt wishes to take as few medications as possible)\par Pt will return in 6 mnths or sooner as needed but I encouraged communication via phone or portal if necessary.\par The described plan was discussed with the pt.  All questions and concerns were addressed to the best of my knowledge.

## 2021-01-05 ENCOUNTER — APPOINTMENT (OUTPATIENT)
Dept: FAMILY MEDICINE | Facility: CLINIC | Age: 61
End: 2021-01-05
Payer: MEDICAID

## 2021-01-05 VITALS
SYSTOLIC BLOOD PRESSURE: 130 MMHG | DIASTOLIC BLOOD PRESSURE: 76 MMHG | OXYGEN SATURATION: 98 % | HEIGHT: 70 IN | TEMPERATURE: 97.5 F | BODY MASS INDEX: 25.77 KG/M2 | HEART RATE: 76 BPM | WEIGHT: 180 LBS

## 2021-01-05 PROCEDURE — 99213 OFFICE O/P EST LOW 20 MIN: CPT

## 2021-01-05 PROCEDURE — 99072 ADDL SUPL MATRL&STAF TM PHE: CPT

## 2021-01-12 ENCOUNTER — APPOINTMENT (OUTPATIENT)
Dept: VASCULAR SURGERY | Facility: CLINIC | Age: 61
End: 2021-01-12
Payer: MEDICAID

## 2021-01-12 VITALS
HEART RATE: 61 BPM | SYSTOLIC BLOOD PRESSURE: 143 MMHG | HEIGHT: 70 IN | DIASTOLIC BLOOD PRESSURE: 81 MMHG | WEIGHT: 180 LBS | TEMPERATURE: 97.8 F | OXYGEN SATURATION: 99 % | BODY MASS INDEX: 25.77 KG/M2

## 2021-01-12 DIAGNOSIS — R42 DIZZINESS AND GIDDINESS: ICD-10-CM

## 2021-01-12 DIAGNOSIS — Z86.79 PERSONAL HISTORY OF OTHER DISEASES OF THE CIRCULATORY SYSTEM: ICD-10-CM

## 2021-01-12 DIAGNOSIS — H81.10 BENIGN PAROXYSMAL VERTIGO, UNSPECIFIED EAR: ICD-10-CM

## 2021-01-12 PROCEDURE — 99203 OFFICE O/P NEW LOW 30 MIN: CPT

## 2021-01-12 PROCEDURE — 99072 ADDL SUPL MATRL&STAF TM PHE: CPT

## 2021-01-12 PROCEDURE — 93880 EXTRACRANIAL BILAT STUDY: CPT

## 2021-01-12 RX ORDER — METFORMIN HYDROCHLORIDE 500 MG/1
500 TABLET, COATED ORAL DAILY
Qty: 270 | Refills: 0 | Status: DISCONTINUED | COMMUNITY
Start: 2020-05-04 | End: 2021-01-12

## 2021-01-18 ENCOUNTER — APPOINTMENT (OUTPATIENT)
Dept: FAMILY MEDICINE | Facility: CLINIC | Age: 61
End: 2021-01-18
Payer: MEDICAID

## 2021-01-18 VITALS
TEMPERATURE: 97.5 F | OXYGEN SATURATION: 97 % | BODY MASS INDEX: 25.77 KG/M2 | SYSTOLIC BLOOD PRESSURE: 140 MMHG | HEART RATE: 79 BPM | DIASTOLIC BLOOD PRESSURE: 76 MMHG | HEIGHT: 70 IN | WEIGHT: 180 LBS

## 2021-01-18 DIAGNOSIS — M50.90 CERVICAL DISC DISORDER, UNSPECIFIED, UNSPECIFIED CERVICAL REGION: ICD-10-CM

## 2021-01-18 PROCEDURE — 99213 OFFICE O/P EST LOW 20 MIN: CPT

## 2021-01-18 PROCEDURE — 99072 ADDL SUPL MATRL&STAF TM PHE: CPT

## 2021-01-18 NOTE — HISTORY OF PRESENT ILLNESS
[FreeTextEntry1] : follow up [de-identified] : follow up on vertigo history of cervical disc disease has seen ent and vascular

## 2021-01-21 PROBLEM — H81.10 BPV (BENIGN POSITIONAL VERTIGO): Status: ACTIVE | Noted: 2021-01-05

## 2021-01-21 PROBLEM — R42 DIZZINESS: Status: ACTIVE | Noted: 2020-05-01

## 2021-01-21 PROBLEM — Z86.79 HISTORY OF CAROTID ATHEROSCLEROSIS: Status: ACTIVE | Noted: 2021-01-05

## 2021-01-21 NOTE — PHYSICAL EXAM
[2+] : left 2+ [Left Carotid Bruit] : no bruit heard over the left carotid [Right Carotid Bruit] : no bruit heard over the right carotid [Ankle Swelling (On Exam)] : not present [Varicose Veins Of Lower Extremities] : not present [] : not present [de-identified] : NAD

## 2021-01-21 NOTE — ASSESSMENT
[Medication Management] : medication management [FreeTextEntry1] : 59 yo M non-smoker with past medical history of hypertension, hypercholesterolemia, diabetes, CAD status post MI, bilateral carotid artery atherosclerosis, and benign positional vertigo presenting for evaluation of dizziness and near syncope. \par \par Prior imaging reviewed.\par \par Carotid duplex in office today demonstrates no evidence of carotid stenosis bilaterally. Fibrocalcific plaque with irregular borders identified at bilateral carotid bulbs. Both vertebral arteries with antegrade flow.

## 2021-01-21 NOTE — HISTORY OF PRESENT ILLNESS
[FreeTextEntry1] : 59 yo M non-smoker with past medical history of hypertension, hypercholesterolemia, diabetes, CAD status post MI, bilateral carotid artery atherosclerosis, and benign positional vertigo presenting for evaluation of dizziness and near syncope.  Patient underwent outpatient carotid duplex which demonstrated 50 to 69% stenosis of the right internal carotid artery without evidence of dissection or occlusion.  Bilateral vertebral arteries with antegrade flow.  Patient denies history of extremity weakness or numbness, slurred speech, facial droop, visual deficits, gait abnormality.  Denies denies prior history of stroke or TIA.  He is currently taking a baby aspirin and a statin daily.  Patient denies recurrent episodes of dizziness at this time.

## 2021-02-12 LAB
25(OH)D3 SERPL-MCNC: 45.2 NG/ML
ALBUMIN SERPL ELPH-MCNC: 4.7 G/DL
ALP BLD-CCNC: 65 U/L
ALT SERPL-CCNC: 20 U/L
ANION GAP SERPL CALC-SCNC: 18 MMOL/L
AST SERPL-CCNC: 25 U/L
BASOPHILS # BLD AUTO: 0.03 K/UL
BASOPHILS NFR BLD AUTO: 0.4 %
BILIRUB SERPL-MCNC: 0.7 MG/DL
BUN SERPL-MCNC: 28 MG/DL
CALCIUM SERPL-MCNC: 10.1 MG/DL
CHLORIDE SERPL-SCNC: 99 MMOL/L
CHOLEST SERPL-MCNC: 235 MG/DL
CO2 SERPL-SCNC: 22 MMOL/L
CREAT SERPL-MCNC: 1.08 MG/DL
EOSINOPHIL # BLD AUTO: 0.12 K/UL
EOSINOPHIL NFR BLD AUTO: 1.5 %
ESTIMATED AVERAGE GLUCOSE: 137 MG/DL
FOLATE SERPL-MCNC: 13.3 NG/ML
GLUCOSE SERPL-MCNC: 96 MG/DL
HBA1C MFR BLD HPLC: 6.4 %
HCT VFR BLD CALC: 41.2 %
HDLC SERPL-MCNC: 49 MG/DL
HGB BLD-MCNC: 13.3 G/DL
IMM GRANULOCYTES NFR BLD AUTO: 0.5 %
LDLC SERPL CALC-MCNC: 152 MG/DL
LYMPHOCYTES # BLD AUTO: 1.25 K/UL
LYMPHOCYTES NFR BLD AUTO: 15.3 %
MAGNESIUM SERPL-MCNC: 1.8 MG/DL
MAN DIFF?: NORMAL
MCHC RBC-ENTMCNC: 30.3 PG
MCHC RBC-ENTMCNC: 32.3 GM/DL
MCV RBC AUTO: 93.8 FL
MONOCYTES # BLD AUTO: 0.53 K/UL
MONOCYTES NFR BLD AUTO: 6.5 %
NEUTROPHILS # BLD AUTO: 6.22 K/UL
NEUTROPHILS NFR BLD AUTO: 75.8 %
NONHDLC SERPL-MCNC: 186 MG/DL
PLATELET # BLD AUTO: 223 K/UL
POTASSIUM SERPL-SCNC: 4.3 MMOL/L
PROT SERPL-MCNC: 7 G/DL
RBC # BLD: 4.39 M/UL
RBC # FLD: 12.4 %
SODIUM SERPL-SCNC: 139 MMOL/L
TRIGL SERPL-MCNC: 170 MG/DL
TSH SERPL-ACNC: 1.55 UIU/ML
VIT B12 SERPL-MCNC: 857 PG/ML
WBC # FLD AUTO: 8.19 K/UL

## 2021-02-17 ENCOUNTER — APPOINTMENT (OUTPATIENT)
Dept: CARDIOLOGY | Facility: CLINIC | Age: 61
End: 2021-02-17

## 2021-02-19 ENCOUNTER — APPOINTMENT (OUTPATIENT)
Dept: CARDIOLOGY | Facility: CLINIC | Age: 61
End: 2021-02-19

## 2021-02-26 ENCOUNTER — APPOINTMENT (OUTPATIENT)
Dept: CARDIOLOGY | Facility: CLINIC | Age: 61
End: 2021-02-26
Payer: MEDICAID

## 2021-02-26 DIAGNOSIS — Z01.810 ENCOUNTER FOR PREPROCEDURAL CARDIOVASCULAR EXAMINATION: ICD-10-CM

## 2021-02-26 PROCEDURE — 99072 ADDL SUPL MATRL&STAF TM PHE: CPT

## 2021-02-26 PROCEDURE — 99214 OFFICE O/P EST MOD 30 MIN: CPT | Mod: 25

## 2021-02-26 PROCEDURE — 93015 CV STRESS TEST SUPVJ I&R: CPT

## 2021-02-26 PROCEDURE — A9500: CPT

## 2021-02-26 PROCEDURE — 99214 OFFICE O/P EST MOD 30 MIN: CPT

## 2021-02-26 PROCEDURE — 78452 HT MUSCLE IMAGE SPECT MULT: CPT

## 2021-03-01 NOTE — HISTORY OF PRESENT ILLNESS
[FreeTextEntry1] : Pt is a 59 y/o M who presents today for f/u.  Pt has PMH CAD s/p MI 12/2012 L shoulder/arm pain s/p 3 stents [facility], he states that his heart muscle was "weakened" but then recovered, DM.  He is compliant with medications including ASA and statin.  \par He is scheduled for an epidural on mon 03/01/2021 with Dr Freddie Brewster.  He currently feels well and has no cardiac complaints.  He walks often and is able to climb hills.  He denies CP, SOB, diaphoresis, palpitations, dizziness, syncope, LE edema, PND, orthopnea.  \par \par pravastatin - leg cramps/muscle aches\par \par PMH: CAD s/p MI/PCI, DM, HLD\par Smoking status: never\par no ETOH\par no drug use\par Current exercise: employed as a , walks\par Daily water intake: "a lot"\par Daily caffeine intake: not consistent\par OTC medications: none\par Family hx: father MI 57, mother MI/PPM 60's, sister lupus\par Previous hospitalizations: MI 2012, hernia repair as a child\par

## 2021-03-10 ENCOUNTER — RX RENEWAL (OUTPATIENT)
Age: 61
End: 2021-03-10

## 2022-01-31 ENCOUNTER — APPOINTMENT (OUTPATIENT)
Dept: UROLOGY | Facility: CLINIC | Age: 62
End: 2022-01-31
Payer: MEDICAID

## 2022-01-31 VITALS — SYSTOLIC BLOOD PRESSURE: 141 MMHG | DIASTOLIC BLOOD PRESSURE: 68 MMHG | TEMPERATURE: 98.1 F | HEART RATE: 69 BPM

## 2022-01-31 PROCEDURE — 99213 OFFICE O/P EST LOW 20 MIN: CPT

## 2022-01-31 NOTE — ASSESSMENT
[FreeTextEntry1] : 62 yo M with ED, good response to ED. Will Rx for BiMix as it should be less expensive.

## 2022-01-31 NOTE — HISTORY OF PRESENT ILLNESS
[FreeTextEntry1] : 62 yo M with ED, previously treated by Dr Christianson. He reports some response to oral PDE5i, but better with EDEX injection. He found EDEX too expensive and he is asking about alternatives.

## 2022-03-30 NOTE — REVIEW OF SYSTEMS
Alert-The patient is alert, awake and responds to voice. The patient is oriented to time, place, and person. The triage nurse is able to obtain subjective information. [Erectile Dysfunction] : erectile dysfunction [Decreased Libido] : decreased libido [Acne] : acne [Insomnia] : insomnia [Anxiety] : anxiety [Heat Intolerance] : heat intolerance [Hot Flashes] : hot flashes [Recent Weight Loss (___ Lbs)] : recent weight loss: [unfilled] lbs [Fatigue] : fatigue [Recent Weight Gain (___ Lbs)] : no recent weight gain [Visual Field Defect] : no visual field defect [Dry Eyes] : no dryness [Eye Pain] : no pain [Blurred Vision] : no blurred vision [Dysphagia] : no dysphagia [Neck Pain] : no neck pain [Dysphonia] : no dysphonia [Chest Pain] : no chest pain [Palpitations] : no palpitations [Shortness Of Breath] : no shortness of breath [Cough] : no cough [Wheezing] : no wheezing [Orthopnea] : no orthopnea [Nausea] : no nausea [Constipation] : no constipation [Vomiting] : no vomiting [Diarrhea] : no diarrhea [Polyuria] : no polyuria [Dysuria] : no dysuria [Nocturia] : no nocturia [Acanthosis] : no acanthosis  [Dry Skin] : no dry skin [Hirsutism] : no hirsutism [Hair Loss] : no hair loss [Headaches] : no headaches [Dizziness] : no dizziness [Confusion] : no confusion [Tremors] : no tremors [Pain/Numbness of Digits] : no pain/numbness of digits [Depression] : no depression [Suicidal Ideation] : no suicidal ideation [Polydipsia] : no polydipsia [Cold Intolerance] : no cold intolerance [Galactorrhea] : no galactorrhea  [Easy Bleeding] : no ~M tendency for easy bleeding [Easy Bruising] : no tendency for easy bruising [Swelling] : no swelling [Lymphadenopathy] : no lymphadenopathy [FreeTextEntry2] : complains of 15 pounds of weight loss, flushing, mood swings [FreeTextEntry7] : occasional abdominal pain

## 2022-04-04 ENCOUNTER — APPOINTMENT (OUTPATIENT)
Dept: FAMILY MEDICINE | Facility: CLINIC | Age: 62
End: 2022-04-04

## 2022-04-06 ENCOUNTER — APPOINTMENT (OUTPATIENT)
Dept: FAMILY MEDICINE | Facility: CLINIC | Age: 62
End: 2022-04-06

## 2022-05-06 ENCOUNTER — APPOINTMENT (OUTPATIENT)
Dept: FAMILY MEDICINE | Facility: CLINIC | Age: 62
End: 2022-05-06
Payer: MEDICAID

## 2022-05-06 VITALS
HEIGHT: 70 IN | BODY MASS INDEX: 24.48 KG/M2 | WEIGHT: 171 LBS | HEART RATE: 63 BPM | DIASTOLIC BLOOD PRESSURE: 80 MMHG | SYSTOLIC BLOOD PRESSURE: 142 MMHG | TEMPERATURE: 97.8 F | OXYGEN SATURATION: 98 %

## 2022-05-06 DIAGNOSIS — R10.9 UNSPECIFIED ABDOMINAL PAIN: ICD-10-CM

## 2022-05-06 LAB
BILIRUB UR QL STRIP: NEGATIVE
GLUCOSE UR-MCNC: NEGATIVE
HCG UR QL: 0.2 EU/DL
HGB UR QL STRIP.AUTO: NORMAL
KETONES UR-MCNC: NEGATIVE
LEUKOCYTE ESTERASE UR QL STRIP: NEGATIVE
NITRITE UR QL STRIP: NEGATIVE
PH UR STRIP: 6
PROT UR STRIP-MCNC: NORMAL
SP GR UR STRIP: >=1.03

## 2022-05-06 PROCEDURE — 99214 OFFICE O/P EST MOD 30 MIN: CPT | Mod: 25

## 2022-05-06 PROCEDURE — 81003 URINALYSIS AUTO W/O SCOPE: CPT | Mod: QW

## 2022-05-06 NOTE — ASSESSMENT
[FreeTextEntry1] : recurrent nephrolithiasis, abd pain - CT abd/pelvis ordered. percocet prn severe pain. flomax qhs. inc po fluids. if pain becomes severe or fever/chills develop go to ER for eval\par \par pt declines labs or crc screening at this time. pt to make f/u appt for CPE

## 2022-05-06 NOTE — HISTORY OF PRESENT ILLNESS
[FreeTextEntry8] : Pt c/o R flank/back pain started last night. pt has hx of recurrent nephrolithiasis. Pain is 7/10 severity radiating from R flank into R lower abdomen/groin. Denies dysuria, fever, chills.

## 2022-05-06 NOTE — PHYSICAL EXAM
[Soft] : abdomen soft [Non-distended] : non-distended [No Masses] : no abdominal mass palpated [Normal] : affect was normal and insight and judgment were intact [de-identified] : RLQ abd tenderness [de-identified] : +R CVA tenderness

## 2022-05-09 ENCOUNTER — NON-APPOINTMENT (OUTPATIENT)
Age: 62
End: 2022-05-09

## 2022-06-01 ENCOUNTER — RX RENEWAL (OUTPATIENT)
Age: 62
End: 2022-06-01

## 2022-06-03 ENCOUNTER — RX RENEWAL (OUTPATIENT)
Age: 62
End: 2022-06-03

## 2022-06-07 ENCOUNTER — APPOINTMENT (OUTPATIENT)
Dept: FAMILY MEDICINE | Facility: CLINIC | Age: 62
End: 2022-06-07

## 2022-06-07 VITALS
HEIGHT: 70 IN | TEMPERATURE: 97.6 F | BODY MASS INDEX: 24.2 KG/M2 | OXYGEN SATURATION: 97 % | HEART RATE: 55 BPM | SYSTOLIC BLOOD PRESSURE: 108 MMHG | DIASTOLIC BLOOD PRESSURE: 70 MMHG | WEIGHT: 169 LBS

## 2022-06-07 DIAGNOSIS — I21.9 ACUTE MYOCARDIAL INFARCTION, UNSPECIFIED: ICD-10-CM

## 2022-06-07 DIAGNOSIS — E28.0 ESTROGEN EXCESS: ICD-10-CM

## 2022-06-07 DIAGNOSIS — N20.0 CALCULUS OF KIDNEY: ICD-10-CM

## 2022-06-07 PROCEDURE — 99214 OFFICE O/P EST MOD 30 MIN: CPT

## 2022-06-07 RX ORDER — CEFUROXIME AXETIL 500 MG/1
500 TABLET ORAL
Qty: 10 | Refills: 0 | Status: COMPLETED | COMMUNITY
Start: 2022-05-08

## 2022-06-07 NOTE — ASSESSMENT
[FreeTextEntry1] : recurrent nephrolithiasis - cont percocet prn severe pain. flomax qhs. inc po fluids. f/u with urologist for stent removal\par CAD s/p stent x 3 - f/u cardio, check flp, should restart statin\par hx elev testosterone and estrogen - recheck levels\par DM- recheck a1c

## 2022-06-07 NOTE — PHYSICAL EXAM
[Soft] : abdomen soft [Non-distended] : non-distended [No Masses] : no abdominal mass palpated [No Rash] : no rash [Normal] : affect was normal and insight and judgment were intact [de-identified] : mild suprapubic tenderness no guarding

## 2022-06-07 NOTE — HISTORY OF PRESENT ILLNESS
[de-identified] : Pt s/p admission to [facility] for severe renal colic and urosepsis on 5/6-5/8/22. Stent was placed by uro Dr. García. Pt to f/u with uro to remove renal stent. Percocet qhs prn severe pain helps to get him comfortable for sleep.\par \par Pt also f/u DM, CAD and MI s/p stent x3 in 2012. Pt has been noncompliant with statin as he changed to vegan diet but has not rechecked his lipids in past year.

## 2022-07-07 ENCOUNTER — NON-APPOINTMENT (OUTPATIENT)
Age: 62
End: 2022-07-07

## 2022-07-13 ENCOUNTER — APPOINTMENT (OUTPATIENT)
Dept: FAMILY MEDICINE | Facility: CLINIC | Age: 62
End: 2022-07-13

## 2022-07-13 VITALS
WEIGHT: 172 LBS | TEMPERATURE: 97.3 F | DIASTOLIC BLOOD PRESSURE: 78 MMHG | OXYGEN SATURATION: 98 % | SYSTOLIC BLOOD PRESSURE: 116 MMHG | HEIGHT: 70 IN | HEART RATE: 77 BPM | BODY MASS INDEX: 24.62 KG/M2

## 2022-07-13 DIAGNOSIS — N23 UNSPECIFIED RENAL COLIC: ICD-10-CM

## 2022-07-13 PROCEDURE — 99213 OFFICE O/P EST LOW 20 MIN: CPT

## 2022-07-13 NOTE — HISTORY OF PRESENT ILLNESS
[FreeTextEntry1] : U  [de-identified] : HFU had renal colic stent placed removed 6 weeks later still in pain had lithotrypsy second stent advised pt declined continues to have pain but has improved needs referral

## 2022-07-13 NOTE — PHYSICAL EXAM
[Normal] : no respiratory distress, lungs were clear to auscultation bilaterally and no accessory muscle use [de-identified] : righ flank pain lumbar pain

## 2022-10-15 ENCOUNTER — NON-APPOINTMENT (OUTPATIENT)
Age: 62
End: 2022-10-15

## 2023-01-28 ENCOUNTER — NON-APPOINTMENT (OUTPATIENT)
Age: 63
End: 2023-01-28

## 2023-02-20 ENCOUNTER — APPOINTMENT (OUTPATIENT)
Dept: FAMILY MEDICINE | Facility: CLINIC | Age: 63
End: 2023-02-20
Payer: MEDICAID

## 2023-02-20 VITALS
OXYGEN SATURATION: 98 % | WEIGHT: 170 LBS | DIASTOLIC BLOOD PRESSURE: 74 MMHG | TEMPERATURE: 98.3 F | BODY MASS INDEX: 24.34 KG/M2 | HEIGHT: 70 IN | HEART RATE: 70 BPM | SYSTOLIC BLOOD PRESSURE: 136 MMHG

## 2023-02-20 DIAGNOSIS — Z95.5 PRESENCE OF CORONARY ANGIOPLASTY IMPLANT AND GRAFT: ICD-10-CM

## 2023-02-20 DIAGNOSIS — Z95.5 ATHEROSCLEROTIC HEART DISEASE OF NATIVE CORONARY ARTERY W/OUT ANGINA PECTORIS: ICD-10-CM

## 2023-02-20 DIAGNOSIS — R79.89 OTHER SPECIFIED ABNORMAL FINDINGS OF BLOOD CHEMISTRY: ICD-10-CM

## 2023-02-20 DIAGNOSIS — I25.10 ATHEROSCLEROTIC HEART DISEASE OF NATIVE CORONARY ARTERY W/OUT ANGINA PECTORIS: ICD-10-CM

## 2023-02-20 DIAGNOSIS — Z00.00 ENCOUNTER FOR GENERAL ADULT MEDICAL EXAMINATION W/OUT ABNORMAL FINDINGS: ICD-10-CM

## 2023-02-20 PROCEDURE — 99396 PREV VISIT EST AGE 40-64: CPT

## 2023-02-20 NOTE — ASSESSMENT
[FreeTextEntry1] : CAD s/p stent x 3 - f/u cardio, check flp, should restart statin if not at goal\par hx elev testosterone - recheck levels\par DM- recheck a1c. carb controlled diet advised \par Healthy diet and regular exercise regimen discussed w/ pt.\par Screening labs ordered\par flu vaccine during flu season recommended\par referral to GI/colorectal for screening colonoscopy\par Any questions call office

## 2023-02-20 NOTE — HISTORY OF PRESENT ILLNESS
[de-identified] : Pt in office for CPE.\par Pt also f/u DM, CAD and MI s/p stent x3 in 2012. Pt has been noncompliant with statin as he changed to vegan diet but has not rechecked his lipids in past year. Pt seeing cardio Dr. Gardner. Pt did not tolerate metformin well as he felt shaky and lethargic on medication.\par Pt s/p cholecystectomy in 2022.\par Pt had hx severe renal colic and urosepsis on 5/6-5/8/22. Pt was seeing Dr. García urology. Pt has been feeling well. Denies CP, palpitations, dyspnea, n/v.\par Nonsmoker\par ETOH use denies\par Drug use denies\par Exercises regularly\par Diet vegan diet\par Works doing [employer] work \par , no children\par Declines shingrix at this time

## 2023-02-26 ENCOUNTER — TRANSCRIPTION ENCOUNTER (OUTPATIENT)
Age: 63
End: 2023-02-26

## 2023-02-28 ENCOUNTER — APPOINTMENT (OUTPATIENT)
Dept: DERMATOLOGY | Facility: CLINIC | Age: 63
End: 2023-02-28
Payer: MEDICAID

## 2023-02-28 DIAGNOSIS — L81.4 OTHER MELANIN HYPERPIGMENTATION: ICD-10-CM

## 2023-02-28 LAB
ALBUMIN SERPL ELPH-MCNC: 4.8 G/DL
ALP BLD-CCNC: 66 U/L
ALT SERPL-CCNC: 21 U/L
ANION GAP SERPL CALC-SCNC: 11 MMOL/L
APPEARANCE: CLEAR
AST SERPL-CCNC: 18 U/L
BACTERIA: NEGATIVE
BASOPHILS # BLD AUTO: 0.03 K/UL
BASOPHILS NFR BLD AUTO: 0.6 %
BILIRUB SERPL-MCNC: 1 MG/DL
BILIRUBIN URINE: NEGATIVE
BLOOD URINE: NEGATIVE
BUN SERPL-MCNC: 14 MG/DL
CALCIUM SERPL-MCNC: 10.1 MG/DL
CHLORIDE SERPL-SCNC: 101 MMOL/L
CHOLEST SERPL-MCNC: 196 MG/DL
CO2 SERPL-SCNC: 29 MMOL/L
COLOR: NORMAL
CREAT SERPL-MCNC: 0.81 MG/DL
EGFR: 98 ML/MIN/1.73M2
EOSINOPHIL # BLD AUTO: 0.07 K/UL
EOSINOPHIL NFR BLD AUTO: 1.4 %
ESTIMATED AVERAGE GLUCOSE: 174 MG/DL
GLUCOSE QUALITATIVE U: NEGATIVE
GLUCOSE SERPL-MCNC: 139 MG/DL
HBA1C MFR BLD HPLC: 7.7 %
HCT VFR BLD CALC: 41.3 %
HDLC SERPL-MCNC: 47 MG/DL
HGB BLD-MCNC: 13.8 G/DL
HYALINE CASTS: 0 /LPF
IMM GRANULOCYTES NFR BLD AUTO: 0.2 %
KETONES URINE: NEGATIVE
LDLC SERPL CALC-MCNC: 130 MG/DL
LEUKOCYTE ESTERASE URINE: NEGATIVE
LYMPHOCYTES # BLD AUTO: 1.55 K/UL
LYMPHOCYTES NFR BLD AUTO: 30.7 %
MAN DIFF?: NORMAL
MCHC RBC-ENTMCNC: 30.9 PG
MCHC RBC-ENTMCNC: 33.4 GM/DL
MCV RBC AUTO: 92.6 FL
MICROSCOPIC-UA: NORMAL
MONOCYTES # BLD AUTO: 0.36 K/UL
MONOCYTES NFR BLD AUTO: 7.1 %
NEUTROPHILS # BLD AUTO: 3.03 K/UL
NEUTROPHILS NFR BLD AUTO: 60 %
NITRITE URINE: NEGATIVE
NONHDLC SERPL-MCNC: 148 MG/DL
PH URINE: 6
PLATELET # BLD AUTO: 208 K/UL
POTASSIUM SERPL-SCNC: 5.2 MMOL/L
PROT SERPL-MCNC: 7.2 G/DL
PROTEIN URINE: NORMAL
PSA SERPL-MCNC: 1.12 NG/ML
RBC # BLD: 4.46 M/UL
RBC # FLD: 12 %
RED BLOOD CELLS URINE: 2 /HPF
SODIUM SERPL-SCNC: 141 MMOL/L
SPECIFIC GRAVITY URINE: 1.02
SQUAMOUS EPITHELIAL CELLS: 0 /HPF
T4 FREE SERPL-MCNC: 1 NG/DL
TESTOST SERPL-MCNC: 665 NG/DL
TRIGL SERPL-MCNC: 90 MG/DL
TSH SERPL-ACNC: 1.25 UIU/ML
UROBILINOGEN URINE: NORMAL
WBC # FLD AUTO: 5.05 K/UL
WHITE BLOOD CELLS URINE: 1 /HPF

## 2023-02-28 PROCEDURE — 99202 OFFICE O/P NEW SF 15 MIN: CPT

## 2023-02-28 RX ORDER — MECLIZINE HYDROCHLORIDE 25 MG/1
25 TABLET ORAL 3 TIMES DAILY
Qty: 30 | Refills: 1 | Status: DISCONTINUED | COMMUNITY
Start: 2021-01-18 | End: 2023-02-28

## 2023-02-28 RX ORDER — OXYCODONE 5 MG/1
5 TABLET ORAL
Qty: 20 | Refills: 0 | Status: DISCONTINUED | COMMUNITY
Start: 2022-07-01 | End: 2023-02-28

## 2023-02-28 RX ORDER — ATORVASTATIN CALCIUM 40 MG/1
40 TABLET, FILM COATED ORAL
Qty: 90 | Refills: 1 | Status: DISCONTINUED | COMMUNITY
Start: 2020-06-22 | End: 2023-02-28

## 2023-02-28 RX ORDER — TAMSULOSIN HYDROCHLORIDE 0.4 MG/1
0.4 CAPSULE ORAL
Qty: 30 | Refills: 0 | Status: DISCONTINUED | COMMUNITY
Start: 2022-05-06 | End: 2023-02-28

## 2023-02-28 RX ORDER — OXYCODONE AND ACETAMINOPHEN 5; 325 MG/1; MG/1
5-325 TABLET ORAL
Qty: 15 | Refills: 0 | Status: DISCONTINUED | COMMUNITY
Start: 2022-05-06 | End: 2023-02-28

## 2023-02-28 NOTE — PHYSICAL EXAM
[Alert] : alert [Oriented x 3] : ~L oriented x 3 [Well Nourished] : well nourished [FreeTextEntry3] : Patient declines full skin exam today\par \par Patient wearing a facemask\par \par Left upper chest: 24 x 23 mm uniformly brown reticulated patch, slightly elevated [place] edge with hint of verrucousness - especially NW. and SE. Edges -seen on dermoscopy

## 2023-02-28 NOTE — HISTORY OF PRESENT ILLNESS
[FreeTextEntry1] : Growth on left chest [de-identified] : First visit for 62-year-old white male presenting with a lesion on the left upper chest present for a few years.  Is episodically itchy.\par Patient has history of basal cell carcinoma treated in the past.\par No history of malignant melanoma.

## 2023-03-04 ENCOUNTER — TRANSCRIPTION ENCOUNTER (OUTPATIENT)
Age: 63
End: 2023-03-04

## 2023-04-03 RX ORDER — DAPAGLIFLOZIN 5 MG/1
5 TABLET, FILM COATED ORAL
Qty: 90 | Refills: 0 | Status: COMPLETED | COMMUNITY
Start: 2023-02-28 | End: 2023-04-03

## 2023-04-28 ENCOUNTER — NON-APPOINTMENT (OUTPATIENT)
Age: 63
End: 2023-04-28

## 2023-06-01 ENCOUNTER — APPOINTMENT (OUTPATIENT)
Dept: FAMILY MEDICINE | Facility: CLINIC | Age: 63
End: 2023-06-01
Payer: MEDICAID

## 2023-06-01 VITALS
DIASTOLIC BLOOD PRESSURE: 80 MMHG | HEIGHT: 70 IN | HEART RATE: 63 BPM | SYSTOLIC BLOOD PRESSURE: 120 MMHG | BODY MASS INDEX: 25.27 KG/M2 | TEMPERATURE: 97.3 F | OXYGEN SATURATION: 97 % | WEIGHT: 176.5 LBS

## 2023-06-01 DIAGNOSIS — M54.40 LUMBAGO WITH SCIATICA, UNSPECIFIED SIDE: ICD-10-CM

## 2023-06-01 DIAGNOSIS — E11.9 TYPE 2 DIABETES MELLITUS W/OUT COMPLICATIONS: ICD-10-CM

## 2023-06-01 PROCEDURE — 99214 OFFICE O/P EST MOD 30 MIN: CPT

## 2023-06-01 RX ORDER — MELOXICAM 15 MG/1
15 TABLET ORAL
Qty: 20 | Refills: 0 | Status: ACTIVE | COMMUNITY
Start: 2023-06-01 | End: 1900-01-01

## 2023-06-01 RX ORDER — TIZANIDINE 4 MG/1
4 TABLET ORAL
Qty: 20 | Refills: 0 | Status: ACTIVE | COMMUNITY
Start: 2023-06-01 | End: 1900-01-01

## 2023-06-01 RX ORDER — ERTUGLIFLOZIN 5 MG/1
5 TABLET, FILM COATED ORAL
Qty: 90 | Refills: 0 | Status: DISCONTINUED | COMMUNITY
Start: 2023-03-08 | End: 2023-06-01

## 2023-06-02 NOTE — ASSESSMENT
[FreeTextEntry1] : DM- cont metformin, carb controlled diet advised. check labs\par low back pain w/ sciatica - mobic, tizanidine prn. Possible adverse effects discussed with pt

## 2023-06-02 NOTE — PHYSICAL EXAM
[Normal] : affect was normal and insight and judgment were intact [de-identified] : low back pain w/ sciatica

## 2023-06-02 NOTE — HISTORY OF PRESENT ILLNESS
[de-identified] : Pt c/o R low back pain w/ R sciatica x 1 week. Pt's sleep interrupted due to pain.\par Pt f/u DM. Last a1c not controlled, pt started on steglatro. Steglatro was d/c'ed by pt as pt felt foggy/forgetful on med. Pt now on metformin 500mg bid. Carb intake improved. Fasting glucose 120s

## 2023-06-05 ENCOUNTER — APPOINTMENT (OUTPATIENT)
Dept: UROLOGY | Facility: CLINIC | Age: 63
End: 2023-06-05
Payer: MEDICAID

## 2023-06-05 VITALS
BODY MASS INDEX: 24.91 KG/M2 | SYSTOLIC BLOOD PRESSURE: 154 MMHG | DIASTOLIC BLOOD PRESSURE: 73 MMHG | HEART RATE: 64 BPM | HEIGHT: 70 IN | WEIGHT: 174 LBS

## 2023-06-05 DIAGNOSIS — Z12.5 ENCOUNTER FOR SCREENING FOR MALIGNANT NEOPLASM OF PROSTATE: ICD-10-CM

## 2023-06-05 LAB
ALBUMIN SERPL ELPH-MCNC: 4.8 G/DL
ALP BLD-CCNC: 57 U/L
ALT SERPL-CCNC: 11 U/L
ANION GAP SERPL CALC-SCNC: 13 MMOL/L
AST SERPL-CCNC: 21 U/L
BILIRUB SERPL-MCNC: 1.1 MG/DL
BUN SERPL-MCNC: 29 MG/DL
CALCIUM SERPL-MCNC: 10.3 MG/DL
CHLORIDE SERPL-SCNC: 106 MMOL/L
CO2 SERPL-SCNC: 25 MMOL/L
CREAT SERPL-MCNC: 1.04 MG/DL
EGFR: 80 ML/MIN/1.73M2
ESTIMATED AVERAGE GLUCOSE: 160 MG/DL
GLUCOSE SERPL-MCNC: 99 MG/DL
HBA1C MFR BLD HPLC: 7.2 %
POTASSIUM SERPL-SCNC: 5.8 MMOL/L
PROT SERPL-MCNC: 7.1 G/DL
SODIUM SERPL-SCNC: 143 MMOL/L

## 2023-06-05 PROCEDURE — 99214 OFFICE O/P EST MOD 30 MIN: CPT

## 2023-06-05 NOTE — PHYSICAL EXAM
[General Appearance - Well Developed] : well developed [General Appearance - Well Nourished] : well nourished [Normal Appearance] : normal appearance [Well Groomed] : well groomed [General Appearance - In No Acute Distress] : no acute distress [Abdomen Soft] : soft [Abdomen Tenderness] : non-tender [Costovertebral Angle Tenderness] : no ~M costovertebral angle tenderness [Urethral Meatus] : meatus normal [Urinary Bladder Findings] : the bladder was normal on palpation [Scrotum] : the scrotum was normal [Testes Mass (___cm)] : there were no testicular masses [No Prostate Nodules] : no prostate nodules [Prostate Tenderness] : the prostate was not tender [Prostate Size ___ gm] : prostate size [unfilled] gm [Edema] : no peripheral edema [] : no respiratory distress [Respiration, Rhythm And Depth] : normal respiratory rhythm and effort [Exaggerated Use Of Accessory Muscles For Inspiration] : no accessory muscle use [Oriented To Time, Place, And Person] : oriented to person, place, and time [Affect] : the affect was normal [Mood] : the mood was normal [Not Anxious] : not anxious [Normal Station and Gait] : the gait and station were normal for the patient's age [No Focal Deficits] : no focal deficits [No Palpable Adenopathy] : no palpable adenopathy

## 2023-06-05 NOTE — ASSESSMENT
[FreeTextEntry1] : 62 yo M with ED, good response to various medications. Will renew Rx for BiMix and PDE5i. For prostate cacner screening, OLGA is benign and PSA unremarkable. Repeat in 1 year.

## 2023-06-05 NOTE — HISTORY OF PRESENT ILLNESS
[FreeTextEntry1] : 62 yo M with ED, previously treated by Dr Christianson. He reports some response to oral PDE5i, but better with EDEX injection. He found EDEX too expensive and he is asking about alternatives. \par \par 06/05/2023: Patient presents for follow up. He reports erections overall improved. Some spontaneous erections, better response to oral medications, but needs ICI on occasion. No LUTS. no other complaints. PSA 1.12, Cr 1.04

## 2023-06-26 ENCOUNTER — APPOINTMENT (OUTPATIENT)
Dept: DERMATOLOGY | Facility: CLINIC | Age: 63
End: 2023-06-26

## 2023-06-30 ENCOUNTER — RX RENEWAL (OUTPATIENT)
Age: 63
End: 2023-06-30

## 2023-07-13 ENCOUNTER — APPOINTMENT (OUTPATIENT)
Dept: FAMILY MEDICINE | Facility: CLINIC | Age: 63
End: 2023-07-13
Payer: MEDICAID

## 2023-07-13 VITALS
HEART RATE: 81 BPM | DIASTOLIC BLOOD PRESSURE: 62 MMHG | SYSTOLIC BLOOD PRESSURE: 121 MMHG | OXYGEN SATURATION: 98 % | BODY MASS INDEX: 24.2 KG/M2 | TEMPERATURE: 96.8 F | HEIGHT: 70 IN | WEIGHT: 169 LBS

## 2023-07-13 DIAGNOSIS — B02.9 ZOSTER W/OUT COMPLICATIONS: ICD-10-CM

## 2023-07-13 PROCEDURE — 99214 OFFICE O/P EST MOD 30 MIN: CPT

## 2023-07-13 RX ORDER — VALACYCLOVIR 1 G/1
1 TABLET, FILM COATED ORAL 3 TIMES DAILY
Qty: 30 | Refills: 0 | Status: ACTIVE | COMMUNITY
Start: 2023-07-13 | End: 1900-01-01

## 2023-07-13 NOTE — ASSESSMENT
[FreeTextEntry1] : shingles - start valtrex course. Possible adverse effects discussed with pt. topical lidocaine prn. if pain worsens start gabapentin

## 2023-07-13 NOTE — HISTORY OF PRESENT ILLNESS
[FreeTextEntry8] : Pt had epidural 2 weeks ago which helped improve sciatica.\par Pt c/o painful sore on lower R back. Rash started 2 days ago.

## 2023-07-14 RX ORDER — LIDOCAINE 5 G/100G
5 OINTMENT TOPICAL
Qty: 1 | Refills: 1 | Status: ACTIVE | COMMUNITY
Start: 2023-07-13

## 2023-07-25 ENCOUNTER — RX CHANGE (OUTPATIENT)
Age: 63
End: 2023-07-25

## 2023-07-28 ENCOUNTER — RX CHANGE (OUTPATIENT)
Age: 63
End: 2023-07-28

## 2023-10-26 ENCOUNTER — RX RENEWAL (OUTPATIENT)
Age: 63
End: 2023-10-26

## 2023-10-26 RX ORDER — METFORMIN HYDROCHLORIDE 500 MG/1
500 TABLET, COATED ORAL
Qty: 180 | Refills: 0 | Status: ACTIVE | COMMUNITY
Start: 2023-03-04 | End: 1900-01-01

## 2024-04-26 ENCOUNTER — TRANSCRIPTION ENCOUNTER (OUTPATIENT)
Age: 64
End: 2024-04-26

## 2024-06-03 ENCOUNTER — APPOINTMENT (OUTPATIENT)
Dept: UROLOGY | Facility: CLINIC | Age: 64
End: 2024-06-03

## 2024-06-24 ENCOUNTER — APPOINTMENT (OUTPATIENT)
Dept: UROLOGY | Facility: CLINIC | Age: 64
End: 2024-06-24
Payer: MEDICARE

## 2024-06-24 VITALS
DIASTOLIC BLOOD PRESSURE: 74 MMHG | HEART RATE: 65 BPM | HEIGHT: 70 IN | WEIGHT: 172 LBS | BODY MASS INDEX: 24.62 KG/M2 | SYSTOLIC BLOOD PRESSURE: 129 MMHG

## 2024-06-24 DIAGNOSIS — N52.01 ERECTILE DYSFUNCTION DUE TO ARTERIAL INSUFFICIENCY: ICD-10-CM

## 2024-06-24 PROCEDURE — 99203 OFFICE O/P NEW LOW 30 MIN: CPT

## 2024-06-24 RX ORDER — PAPAVERINE HYDROCHLORIDE 30 MG/ML
30 INJECTION, SOLUTION INTRAVENOUS
Qty: 90 | Refills: 3 | Status: ACTIVE | COMMUNITY
Start: 2022-01-31 | End: 1900-01-01

## 2024-06-26 ENCOUNTER — NON-APPOINTMENT (OUTPATIENT)
Age: 64
End: 2024-06-26

## 2024-12-24 NOTE — DISCUSSION/SUMMARY
[place] [facility]. Has a pelvic binder in place. Had a tap. Sedated with Ketamine during surgery. Had a difficult time waking up after surgery. On BP medications. Needs to be observed overnight. No pre treatment en route. Hemodynamically stable en route. Nauseous on arrival.          [FreeTextEntry1] : Pt is a 61 y/o M who presents today for f/u.  Pt has PMH CAD s/p MI 12/2012 L shoulder/arm pain s/p 3 stents [place], he states that his heart muscle was "weakened" but then recovered, DM.  He is compliant with medications including ASA and statin.  \par He is scheduled for an epidural on mon 03/01/2021 with Dr Freddie Brewster.  He currently feels well and has no cardiac complaints.  He walks often and is able to climb hills.  He denies CP, SOB, diaphoresis, palpitations, dizziness, syncope, LE edema, PND, orthopnea.  \par c/w lipitor 20mg- check labs\par BP well controlled \par At this time the pt has no signs or symptoms of active ischemia, heart failure, life-threatening arrhythmias, severe valvular pathology.\par VSS\par There are no cardiac contraindications for planned procedure. \par Pt will return in 6 mnths or sooner as needed but I encouraged communication via phone or portal if necessary.\par The described plan was discussed with the pt.  All questions and concerns were addressed to the best of my knowledge.

## 2025-02-11 ENCOUNTER — NON-APPOINTMENT (OUTPATIENT)
Age: 65
End: 2025-02-11

## 2025-06-30 ENCOUNTER — APPOINTMENT (OUTPATIENT)
Dept: UROLOGY | Facility: CLINIC | Age: 65
End: 2025-06-30

## 2025-09-19 ENCOUNTER — NON-APPOINTMENT (OUTPATIENT)
Age: 65
End: 2025-09-19

## 2025-09-22 PROBLEM — M25.562 ACUTE PAIN OF LEFT KNEE: Status: ACTIVE | Noted: 2025-09-22
